# Patient Record
Sex: MALE | Race: OTHER | NOT HISPANIC OR LATINO
[De-identification: names, ages, dates, MRNs, and addresses within clinical notes are randomized per-mention and may not be internally consistent; named-entity substitution may affect disease eponyms.]

---

## 2022-01-08 ENCOUNTER — TRANSCRIPTION ENCOUNTER (OUTPATIENT)
Age: 67
End: 2022-01-08

## 2022-01-09 ENCOUNTER — OUTPATIENT (OUTPATIENT)
Dept: OUTPATIENT SERVICES | Facility: HOSPITAL | Age: 67
LOS: 1 days | End: 2022-01-09

## 2022-01-09 ENCOUNTER — APPOINTMENT (OUTPATIENT)
Dept: DISASTER EMERGENCY | Facility: HOSPITAL | Age: 67
End: 2022-01-09

## 2022-01-09 VITALS
HEART RATE: 67 BPM | TEMPERATURE: 99 F | RESPIRATION RATE: 18 BRPM | DIASTOLIC BLOOD PRESSURE: 71 MMHG | OXYGEN SATURATION: 97 % | SYSTOLIC BLOOD PRESSURE: 111 MMHG

## 2022-01-09 VITALS
HEART RATE: 66 BPM | DIASTOLIC BLOOD PRESSURE: 71 MMHG | SYSTOLIC BLOOD PRESSURE: 108 MMHG | OXYGEN SATURATION: 99 % | TEMPERATURE: 98 F | RESPIRATION RATE: 18 BRPM

## 2022-01-09 DIAGNOSIS — U07.1 COVID-19: ICD-10-CM

## 2022-01-09 PROBLEM — Z00.00 ENCOUNTER FOR PREVENTIVE HEALTH EXAMINATION: Status: ACTIVE | Noted: 2022-01-09

## 2022-01-09 RX ORDER — SOTROVIMAB 62.5 MG/ML
500 INJECTION, SOLUTION, CONCENTRATE INTRAVENOUS ONCE
Refills: 0 | Status: DISCONTINUED | OUTPATIENT
Start: 2022-01-09 | End: 2022-01-24

## 2022-01-09 RX ORDER — SODIUM CHLORIDE 9 MG/ML
250 INJECTION INTRAMUSCULAR; INTRAVENOUS; SUBCUTANEOUS
Refills: 0 | Status: DISCONTINUED | OUTPATIENT
Start: 2022-01-09 | End: 2022-01-24

## 2022-01-09 NOTE — CHART NOTE - NSCHARTNOTEFT_GEN_A_CORE
Sotrovimab infusion and post infusion protocol complete  Patient tolerated well and denies any new complaints   T(C): 36.8 (01-09-22 @ 15:15), Max: 37 (01-09-22 @ 13:16)  HR: 66 (01-09-22 @ 15:15) (65 - 67)  BP: 108/71 (01-09-22 @ 15:15) (99/63 - 111/71)  RR: 18 (01-09-22 @ 15:15) (18 - 18)  SpO2: 99% (01-09-22 @ 15:15) (96% - 99%)      Patient tolerated infusion well and is stable for discharge home  Discharge instructions and strict return precautions reviewed with the patient who indicates understanding. All questions answered.

## 2022-01-09 NOTE — CHART NOTE - NSCHARTNOTEFT_GEN_A_CORE
CC: Monoclonal Antibody Infusion/COVID 19 Positive      History: Patient presents for infusion of monoclonal antibody infusion. Patient has been screened and was deemed to be a candidate.    Exposure: Social gathering last weekend    Symptoms/ Criteria: cough headache     Inclusion Criteria: age > 65 years    Date of Positive Test: verified by clinical call center     Date of symptom onset: 1/5    Vaccine status: unvaccinated    PMHx:  Infection due to severe acute respiratory syndrome coronavirus 2 (SARS-CoV-2)    MEWS Score    COVID-19          T(C): 37 (01-09-22 @ 13:16), Max: 37 (01-09-22 @ 13:16)  HR: 66 (01-09-22 @ 13:46) (66 - 67)  BP: 104/66 (01-09-22 @ 13:46) (104/66 - 111/71)  RR: 18 (01-09-22 @ 13:46) (18 - 18)  SpO2: 96% (01-09-22 @ 13:46) (96% - 97%)      PE:   Appearance: NAD	  HEENT:   Normal oral mucosa.   Lymphatic: No lymphadenopathy  Cardiovascular: Normal S1 S2, No JVD, No murmurs, No edema  Respiratory: Lungs clear to auscultation	  Gastrointestinal:  Soft, Non-tender. No guarding   Skin: warm and dry  Neurologic: Non-focal  Extremities: Normal range of motion.     ASSESSMENT:  Pt is a 66y year old Male without significant  PMH  Covid +  referred to the infusion center for Monoclonal antibody infusion (Sotrovimab).        PLAN:  - infusion procedure explained to patient   - Consent for monoclonal antibody infusion obtained   - Risk & benefits discussed/all questions answered  - infuse Sotrovimab per protocol  - will observe patient for one hour post infusion  and then if stable discharge home with outpt follow up as planned by PMD.        - Patient tolerated infusion well denies complaints of chest pain/SOB/dizziness/ palpitations  - VSS for discharge home  - D/C instructions given/ fact sheet included.  - Patient to follow-up with PCP as needed.

## 2022-01-10 ENCOUNTER — APPOINTMENT (OUTPATIENT)
Dept: DISASTER EMERGENCY | Facility: HOSPITAL | Age: 67
End: 2022-01-10

## 2022-12-15 ENCOUNTER — APPOINTMENT (OUTPATIENT)
Dept: UROLOGY | Facility: CLINIC | Age: 67
End: 2022-12-15
Payer: COMMERCIAL

## 2022-12-15 VITALS
DIASTOLIC BLOOD PRESSURE: 76 MMHG | SYSTOLIC BLOOD PRESSURE: 130 MMHG | BODY MASS INDEX: 26.52 KG/M2 | TEMPERATURE: 97.5 F | HEIGHT: 66 IN | WEIGHT: 165 LBS | HEART RATE: 58 BPM

## 2022-12-15 PROCEDURE — 52000 CYSTOURETHROSCOPY: CPT

## 2022-12-15 PROCEDURE — 99204 OFFICE O/P NEW MOD 45 MIN: CPT | Mod: 25

## 2022-12-15 NOTE — HISTORY OF PRESENT ILLNESS
[FreeTextEntry1] : 67M BPH on flaomx 0.4 \par bring in MRI 11/2022\par 88 gram porstate with 1.5cm intravesical protrusion PRIADS 2\par MRI 2017 55 gram.\par prostate biopsy 2017 negative. PSA 8\par nocutira x 3-5\par increasing hesitancy\par decreased FOS\par +urgency\par no hematuria

## 2022-12-15 NOTE — ASSESSMENT
[FreeTextEntry1] : BPH\par on flaomx 0.4\par hesitant to take medication\par suboptimal urolift\par cysto today: large lateral and median lobes\par We spoke at length about the role of transurethral thermal ablation of the prostate/Rezum. He understands that this is a minimally invasive procedure which may help significantly with his lower urinary tract symptoms and get him off of medications. Risks, including pelvic pain, gross hematuria, dysuria and short lived urinary urgency were discussed at length. He understands that he will require a postoperative catheter for 2-3 days in most instances and that he is likely to return to work within a day or two. He was told that there is a very low risk of ejaculatory and erectile dysfunction postoperatively. Improvements in urinary symptoms are seen in 1-6 months after the procedure.\par The risks and benefits of transurethral resection of the prostate were discussed at length today. Given his symptoms and exam findings, I told him he has an approximately 70% likelihood of improvement in his LUTS/urinary retention in the postoperative period. He understands that this means that some men do not improve after surgery. The role of postop catheterization, and the potential for a longer term bruno were discussed at length. The risk of urinary tract infection and possible sepsis along with its associated sequelae were discussed. The very low risk of urinary incontinence as well as bladder overactivity were discussed. The risk of gross hematuria, and the fact that he cannot perform strenous activity for one month were also reported. He understands that he is likely to have retrograde ejaculation following this procedure.  He opts to proceed.\par \par for TURP\par

## 2022-12-15 NOTE — PHYSICAL EXAM
[Urethral Meatus] : meatus normal [Penis Abnormality] : normal uncircumcised penis [Urinary Bladder Findings] : the bladder was normal on palpation [Scrotum] : the scrotum was normal [Epididymis] : the epididymides were normal [Testes Tenderness] : no tenderness of the testes [Testes Mass (___cm)] : there were no testicular masses [FreeTextEntry1] : left inguinal hernia

## 2022-12-16 LAB
ANION GAP SERPL CALC-SCNC: 10 MMOL/L
APPEARANCE: CLEAR
APTT BLD: 34.3 SEC
BACTERIA: NEGATIVE
BASOPHILS # BLD AUTO: 0.03 K/UL
BASOPHILS NFR BLD AUTO: 0.5 %
BILIRUBIN URINE: NEGATIVE
BLOOD URINE: NEGATIVE
BUN SERPL-MCNC: 18 MG/DL
CALCIUM SERPL-MCNC: 9.7 MG/DL
CHLORIDE SERPL-SCNC: 103 MMOL/L
CO2 SERPL-SCNC: 29 MMOL/L
COLOR: NORMAL
CREAT SERPL-MCNC: 1.22 MG/DL
EGFR: 64 ML/MIN/1.73M2
EOSINOPHIL # BLD AUTO: 0.27 K/UL
EOSINOPHIL NFR BLD AUTO: 4.3 %
GLUCOSE QUALITATIVE U: NEGATIVE
GLUCOSE SERPL-MCNC: 96 MG/DL
HCT VFR BLD CALC: 42.2 %
HGB BLD-MCNC: 14 G/DL
HYALINE CASTS: 0 /LPF
IMM GRANULOCYTES NFR BLD AUTO: 0.5 %
INR PPP: 1 RATIO
KETONES URINE: NEGATIVE
LEUKOCYTE ESTERASE URINE: NEGATIVE
LYMPHOCYTES # BLD AUTO: 2.18 K/UL
LYMPHOCYTES NFR BLD AUTO: 35 %
MAN DIFF?: NORMAL
MCHC RBC-ENTMCNC: 31.5 PG
MCHC RBC-ENTMCNC: 33.2 GM/DL
MCV RBC AUTO: 94.8 FL
MICROSCOPIC-UA: NORMAL
MONOCYTES # BLD AUTO: 0.48 K/UL
MONOCYTES NFR BLD AUTO: 7.7 %
NEUTROPHILS # BLD AUTO: 3.23 K/UL
NEUTROPHILS NFR BLD AUTO: 52 %
NITRITE URINE: NEGATIVE
PH URINE: 6
PLATELET # BLD AUTO: 203 K/UL
POTASSIUM SERPL-SCNC: 4.5 MMOL/L
PROTEIN URINE: NEGATIVE
PSA SERPL-MCNC: 3.76 NG/ML
PT BLD: 11.7 SEC
RBC # BLD: 4.45 M/UL
RBC # FLD: 12.5 %
RED BLOOD CELLS URINE: 0 /HPF
SARS-COV-2 N GENE NPH QL NAA+PROBE: NOT DETECTED
SODIUM SERPL-SCNC: 142 MMOL/L
SPECIFIC GRAVITY URINE: 1.01
SQUAMOUS EPITHELIAL CELLS: 0 /HPF
UROBILINOGEN URINE: NORMAL
WBC # FLD AUTO: 6.22 K/UL
WHITE BLOOD CELLS URINE: 0 /HPF

## 2022-12-19 LAB — BACTERIA UR CULT: NORMAL

## 2023-01-05 ENCOUNTER — APPOINTMENT (OUTPATIENT)
Dept: HEART AND VASCULAR | Facility: CLINIC | Age: 68
End: 2023-01-05
Payer: COMMERCIAL

## 2023-01-05 ENCOUNTER — NON-APPOINTMENT (OUTPATIENT)
Age: 68
End: 2023-01-05

## 2023-01-05 VITALS
BODY MASS INDEX: 24.25 KG/M2 | HEART RATE: 62 BPM | TEMPERATURE: 97 F | SYSTOLIC BLOOD PRESSURE: 112 MMHG | DIASTOLIC BLOOD PRESSURE: 59 MMHG | WEIGHT: 160 LBS | HEIGHT: 68 IN

## 2023-01-05 DIAGNOSIS — Z78.9 OTHER SPECIFIED HEALTH STATUS: ICD-10-CM

## 2023-01-05 DIAGNOSIS — I48.0 PAROXYSMAL ATRIAL FIBRILLATION: ICD-10-CM

## 2023-01-05 DIAGNOSIS — Z82.49 FAMILY HISTORY OF ISCHEMIC HEART DISEASE AND OTHER DISEASES OF THE CIRCULATORY SYSTEM: ICD-10-CM

## 2023-01-05 PROCEDURE — 93000 ELECTROCARDIOGRAM COMPLETE: CPT

## 2023-01-05 PROCEDURE — 99204 OFFICE O/P NEW MOD 45 MIN: CPT | Mod: 25

## 2023-01-07 ENCOUNTER — OUTPATIENT (OUTPATIENT)
Dept: OUTPATIENT SERVICES | Facility: HOSPITAL | Age: 68
LOS: 1 days | End: 2023-01-07

## 2023-01-07 ENCOUNTER — APPOINTMENT (OUTPATIENT)
Dept: SLEEP CENTER | Facility: HOME HEALTH | Age: 68
End: 2023-01-07
Payer: COMMERCIAL

## 2023-01-07 PROCEDURE — 95800 SLP STDY UNATTENDED: CPT | Mod: 26

## 2023-01-09 ENCOUNTER — NON-APPOINTMENT (OUTPATIENT)
Age: 68
End: 2023-01-09

## 2023-01-09 LAB — SARS-COV-2 N GENE NPH QL NAA+PROBE: NOT DETECTED

## 2023-01-09 NOTE — PHYSICAL EXAM
[Well Developed] : well developed [Well Nourished] : well nourished [No Acute Distress] : no acute distress [5th Left ICS - MCL] : palpated at the 5th LICS in the midclavicular line [Normal Rate] : normal [Rhythm Regular] : regular [Normal S1] : normal S1 [Normal S2] : normal S2 [Clear Lung Fields] : clear lung fields [Good Air Entry] : good air entry [No Respiratory Distress] : no respiratory distress  [Soft] : abdomen soft [Normal Gait] : normal gait [No Edema] : no edema [No Rash] : no rash [Moves all extremities] : moves all extremities [No Focal Deficits] : no focal deficits [Alert and Oriented] : alert and oriented

## 2023-01-10 ENCOUNTER — TRANSCRIPTION ENCOUNTER (OUTPATIENT)
Age: 68
End: 2023-01-10

## 2023-01-10 NOTE — ASU PATIENT PROFILE, ADULT - NS PREOP UNDERSTANDS INFO
spoke to patient to be NPO after 2300 tonight , Allow to drink water and apple juice till 6 am , leave all valuable things at home. dress comfortable. non   smoking or alcohol, Bring ID photo,  and insurance  cards. escort arranged, address and telephone given to patient/yes

## 2023-01-10 NOTE — ASU PATIENT PROFILE, ADULT - NSICDXPASTMEDICALHX_GEN_ALL_CORE_FT
PAST MEDICAL HISTORY:  Cardiac arrhythmia     Enlarged prostate      PAST MEDICAL HISTORY:  2019 novel coronavirus disease (COVID-19) X2    Cardiac arrhythmia     Enlarged prostate     Hyperlipidemia     Pre-diabetes      PAST MEDICAL HISTORY:  2019 novel coronavirus disease (COVID-19) X2    Cardiac arrhythmia     Enlarged prostate     History of bilateral inguinal hernias wears belt for bilateral hernias    Hyperlipidemia     Pre-diabetes

## 2023-01-10 NOTE — ASU PATIENT PROFILE, ADULT - FALL HARM RISK - UNIVERSAL INTERVENTIONS
Bed in lowest position, wheels locked, appropriate side rails in place/Call bell, personal items and telephone in reach/Instruct patient to call for assistance before getting out of bed or chair/Non-slip footwear when patient is out of bed/[place] to call system/Physically safe environment - no spills, clutter or unnecessary equipment/Purposeful Proactive Rounding/Room/bathroom lighting operational, light cord in reach

## 2023-01-11 ENCOUNTER — RESULT REVIEW (OUTPATIENT)
Age: 68
End: 2023-01-11

## 2023-01-11 ENCOUNTER — OUTPATIENT (OUTPATIENT)
Dept: OUTPATIENT SERVICES | Facility: HOSPITAL | Age: 68
LOS: 1 days | Discharge: HOME | End: 2023-01-11
Payer: COMMERCIAL

## 2023-01-11 ENCOUNTER — TRANSCRIPTION ENCOUNTER (OUTPATIENT)
Age: 68
End: 2023-01-11

## 2023-01-11 ENCOUNTER — APPOINTMENT (OUTPATIENT)
Dept: UROLOGY | Facility: AMBULATORY SURGERY CENTER | Age: 68
End: 2023-01-11

## 2023-01-11 VITALS
OXYGEN SATURATION: 99 % | HEART RATE: 57 BPM | WEIGHT: 151.46 LBS | HEIGHT: 67 IN | TEMPERATURE: 98 F | RESPIRATION RATE: 17 BRPM | SYSTOLIC BLOOD PRESSURE: 127 MMHG | DIASTOLIC BLOOD PRESSURE: 65 MMHG

## 2023-01-11 VITALS
HEART RATE: 70 BPM | SYSTOLIC BLOOD PRESSURE: 114 MMHG | TEMPERATURE: 97 F | DIASTOLIC BLOOD PRESSURE: 52 MMHG | OXYGEN SATURATION: 98 % | RESPIRATION RATE: 16 BRPM

## 2023-01-11 DIAGNOSIS — G47.33 OBSTRUCTIVE SLEEP APNEA (ADULT) (PEDIATRIC): ICD-10-CM

## 2023-01-11 DIAGNOSIS — Z90.49 ACQUIRED ABSENCE OF OTHER SPECIFIED PARTS OF DIGESTIVE TRACT: Chronic | ICD-10-CM

## 2023-01-11 DIAGNOSIS — Z98.890 OTHER SPECIFIED POSTPROCEDURAL STATES: Chronic | ICD-10-CM

## 2023-01-11 PROCEDURE — 88305 TISSUE EXAM BY PATHOLOGIST: CPT | Mod: 26

## 2023-01-11 PROCEDURE — 52601 PROSTATECTOMY (TURP): CPT

## 2023-01-11 RX ORDER — TAMSULOSIN HYDROCHLORIDE 0.4 MG/1
1 CAPSULE ORAL
Qty: 0 | Refills: 0 | DISCHARGE

## 2023-01-11 RX ORDER — ONDANSETRON 8 MG/1
4 TABLET, FILM COATED ORAL ONCE
Refills: 0 | Status: DISCONTINUED | OUTPATIENT
Start: 2023-01-11 | End: 2023-01-11

## 2023-01-11 RX ORDER — SODIUM CHLORIDE 9 MG/ML
1000 INJECTION, SOLUTION INTRAVENOUS
Refills: 0 | Status: DISCONTINUED | OUTPATIENT
Start: 2023-01-11 | End: 2023-01-11

## 2023-01-11 RX ORDER — ACETAMINOPHEN 500 MG
1000 TABLET ORAL ONCE
Refills: 0 | Status: COMPLETED | OUTPATIENT
Start: 2023-01-11 | End: 2023-01-11

## 2023-01-11 RX ORDER — FENTANYL CITRATE 50 UG/ML
25 INJECTION INTRAVENOUS
Refills: 0 | Status: DISCONTINUED | OUTPATIENT
Start: 2023-01-11 | End: 2023-01-11

## 2023-01-11 RX ORDER — METOPROLOL TARTRATE 50 MG
1 TABLET ORAL
Qty: 0 | Refills: 0 | DISCHARGE

## 2023-01-11 RX ORDER — ACETAMINOPHEN WITH CODEINE 300MG-30MG
1 TABLET ORAL
Qty: 10 | Refills: 0
Start: 2023-01-11

## 2023-01-11 RX ADMIN — Medication 1000 MILLIGRAM(S): at 10:20

## 2023-01-11 RX ADMIN — SODIUM CHLORIDE 100 MILLILITER(S): 9 INJECTION, SOLUTION INTRAVENOUS at 15:23

## 2023-01-11 NOTE — ASU DISCHARGE PLAN (ADULT/PEDIATRIC) - ASU DC SPECIAL INSTRUCTIONSFT
TRANSURETHRAL RESECTION OF PROSTATE    GENERAL: It is common to have blood in your urine after your procedure.  It may be pink or even red; and it is important to increase fluid intake to 2-3L of water per day to keep the urine as clear as possible. Please inform your doctor if you have a significant amount of clot in the urine or if you are unable to void at all.  The urine may clear and then become bloody again especially as you are more physically active. It is not uncommon to have some burning when you urinate, this will gradually improve. With a catheter in place, it is not uncommon to have occasional leakage or urine or blood around the catheter. Please call your urologist if this is excessive and/or the urine is not draining through the catheter into the bag.    CATHETER: Most patients are sent home with a Bruno catheter, which continuously drains the urine from the bladder. If you still have a catheter, the nurses will review instructions and care before you go home. For men, you may have a prescription for lidocaine jelly to apply to the tip of your penis, as needed, for catheter related discomfort.    PAIN: You may take Tylenol (acetaminophen) 650-975mg and/or Motrin (ibuprofen) 400-600mg, both available over the counter, for pain every 6 hours as needed. Do not exceed 4000mg of Tylenol (acetaminophen) daily. You may alternate these medications such that you take one or the other every 3 hours for around the clock pain coverage.    STOOL SOFTENERS: Do not allow yourself to become constipated as straining may cause bleeding. Take stool softeners or a laxative (ex. Miralax, Colace, Senokot, ExLax, etc), available over the counter, if needed.    ANTICOAGULATION: If you are taking any blood thinning medications, please discuss with your urologist prior to restarting these medications unless otherwise specified.    BATHING: You may shower or bathe. If going home with bruno, shower only until catheter is removed.    DIET: You may resume your regular diet and regular medication regimen.    ACTIVITY: No heavy lifting or strenuous exercise until you are evaluated at your post-operative appointment. Otherwise, you may return to your usual level of physical activity.    FOLLOW-UP: If you did not already schedule your post-operative appointment, please call your urologist to schedule and follow-up appointment.    CALL YOUR UROLOGIST IF: You have any bleeding that does not stop, inability to void >8 hours, fever over 100.4 F, chills, persistent nausea/vomiting, changes in your incision concerning for infection, or if your pain is not controlled on your discharge pain medications.

## 2023-01-11 NOTE — REVIEW OF SYSTEMS
[Palpitations] : palpitations [Fever] : no fever [Weight Gain (___ Lbs)] : no recent weight gain [Chills] : no chills [Weight Loss (___ Lbs)] : no recent weight loss [SOB] : no shortness of breath [Dyspnea on exertion] : not dyspnea during exertion [Chest Discomfort] : no chest discomfort [Syncope] : no syncope [Cough] : no cough [Wheezing] : no wheezing [Rash] : no rash [Dizziness] : no dizziness [Confusion] : no confusion was observed [Easy Bleeding] : no tendency for easy bleeding

## 2023-01-11 NOTE — BRIEF OPERATIVE NOTE - NSICDXBRIEFPROCEDURE_GEN_ALL_CORE_FT
PROCEDURES:  Cystoscopy with transurethral resection of prostate (TURP) 11-Jan-2023 16:33:39  Gustavo Dubon

## 2023-01-11 NOTE — HISTORY OF PRESENT ILLNESS
[FreeTextEntry1] : 67 year old male with paroxysmal atrial fibrillation, who presents for initial evaluation.\par \par He states in March 2022 he went to [facility] and he was in an arrhythmia.  ER note scanned in says both atrial flutter and atrial fibrillation - though he remembers being told he had atrial fibrillation.  He states he was discharged with a blood thinner but he never took it.  As per ER notes echo at that time LVEF 65% with no significant valvular abnormalities.  He then states on new years otto he had palpitations and called 911 but they stopped before the ambulance arrived.  Recently he has been having palpitations more frequently.  He has been on Metoprolol or a week and feels better.  No clear precipitating or alleviating factors.   Though he states both big episodes of palpitations were in the setting of having a mild fever.  \par \par He has a urological procedure next week (TURP).  He admits to snoring.  No chest pain, syncope, near syncope, SOB or orthopnea.  \par

## 2023-01-11 NOTE — HISTORY OF PRESENT ILLNESS
[FreeTextEntry1] : 67 year old male with paroxysmal atrial fibrillation, who presents for initial evaluation.\par \par He states in March 2022 he went to [facility] and he was in an arrhythmia.  ER note scanned in says both atrial flutter and atrial fibrillation - though he remembers being told he had atrial fibrillation.  He states he was discharged with a blood thinner but he never took it.  As per ER notes echo at that time LVEF 65% with no significant valvular abnormalities.  He then states on new years otto he had palpitations and called 911 but they stopped before the ambulance arrived.  Recently he has been having palpitations more frequently.  He has been on Metoprolol or a week and feels better.  No clear precipitating or alleviating factors.   Though he states both big episodes of palpitations were in the setting of having a mild fever.  \par \par He has a urological procedure next week (TURP).  He admits to snoring.  No chest pain, syncope, near syncope, SOB or orthopnea.  \par

## 2023-01-11 NOTE — DISCUSSION/SUMMARY
[EKG obtained to assist in diagnosis and management of assessed problem(s)] : EKG obtained to assist in diagnosis and management of assessed problem(s) [FreeTextEntry1] : 67 year old male with paroxysmal atrial fibrillation, who presents for initial evaluation.  As per notes he has likely atrial fibrillation (?flutter). We discussed the normal conduction system of the heart and what atrial fibrillation is and the association with stroke.   His CHADSVASC score is at least 1 and we discussed he should be on oral anticoagulation which he is amenable to starting - but would not start until he is cleared from a TURP / procedural standpoint.  He snores and we discussed the association between sleep apnea and atrial arrhythmias and we will order him a sleep study.   His two major episodes were in the setting of being ill - but he has short episodes of palpitations.  He is doing better on a beta blocker.  Long term treatment options of paroxysmal atrial fibrillation include rate control or a rhythm control strategy with an ablation or antiarrhythmic medication.  He will follow up after his sleep study and urological procedure and we will likely place an event monitor to assess short episodes of palpitations if they continue.  He is interested in an ablation and we will assess at this time.  He knows to call with any questions or concerns.

## 2023-01-11 NOTE — ASU DISCHARGE PLAN (ADULT/PEDIATRIC) - NS MD DC FALL RISK RISK
For information on Fall & Injury Prevention, visit: [email or web address] OR  [email or web address] OR  https://www.cdc.gov/steadi/patient.html

## 2023-01-11 NOTE — PRE-ANESTHESIA EVALUATION ADULT - NSANTHPEFT_GEN_ALL_CORE
b/l BS clear  irregularly irregular radial pulse palpated; pt denies palpitations since initial dx of AFib "several weeks ago"  Denies Hx of syncope; endorses METS >4

## 2023-01-11 NOTE — ADDENDUM
[FreeTextEntry1] : I, Jonathan Webster, hereby attest that the medical record entry for this patient accurately reflects signatures/notations that I made on the Date of Service in my capacity as an Attending Physician when I treated/diagnosed the above patient. I do hereby attest that this information is true, accurate and complete to the best of my knowledge and I understand that any falsification, omission, or concealment of material fact may subject me to administrative, civil, or, criminal liability. I agree with the note as written by my PA in its entirety.\par I was present for the entire visit and supervised the entire visit and agree with the plan as outlined.\par \par \par I, Severiano Hernandez, am scribing for and the presence of Dr. Webster the following sections: HPI, PMH,Family/social history, ROS, Physical Exam, Assessment / Plan.\par

## 2023-01-13 PROBLEM — I49.9 CARDIAC ARRHYTHMIA, UNSPECIFIED: Chronic | Status: ACTIVE | Noted: 2023-01-10

## 2023-01-13 PROBLEM — U07.1 COVID-19: Chronic | Status: ACTIVE | Noted: 2023-01-11

## 2023-01-13 PROBLEM — Z87.19 PERSONAL HISTORY OF OTHER DISEASES OF THE DIGESTIVE SYSTEM: Chronic | Status: ACTIVE | Noted: 2023-01-11

## 2023-01-13 PROBLEM — E78.5 HYPERLIPIDEMIA, UNSPECIFIED: Chronic | Status: ACTIVE | Noted: 2023-01-11

## 2023-01-13 PROBLEM — R73.03 PREDIABETES: Chronic | Status: ACTIVE | Noted: 2023-01-11

## 2023-01-13 PROBLEM — N40.0 BENIGN PROSTATIC HYPERPLASIA WITHOUT LOWER URINARY TRACT SYMPTOMS: Chronic | Status: ACTIVE | Noted: 2023-01-10

## 2023-01-13 LAB
-  AMPICILLIN: SIGNIFICANT CHANGE UP
-  CIPROFLOXACIN: SIGNIFICANT CHANGE UP
-  NITROFURANTOIN: SIGNIFICANT CHANGE UP
-  TETRACYCLINE: SIGNIFICANT CHANGE UP
-  VANCOMYCIN: SIGNIFICANT CHANGE UP
CULTURE RESULTS: SIGNIFICANT CHANGE UP
METHOD TYPE: SIGNIFICANT CHANGE UP
ORGANISM # SPEC MICROSCOPIC CNT: SIGNIFICANT CHANGE UP
ORGANISM # SPEC MICROSCOPIC CNT: SIGNIFICANT CHANGE UP
SPECIMEN SOURCE: SIGNIFICANT CHANGE UP

## 2023-01-17 ENCOUNTER — APPOINTMENT (OUTPATIENT)
Dept: UROLOGY | Facility: CLINIC | Age: 68
End: 2023-01-17
Payer: COMMERCIAL

## 2023-01-17 VITALS — DIASTOLIC BLOOD PRESSURE: 74 MMHG | TEMPERATURE: 97.5 F | SYSTOLIC BLOOD PRESSURE: 128 MMHG | HEART RATE: 73 BPM

## 2023-01-17 PROCEDURE — 99024 POSTOP FOLLOW-UP VISIT: CPT

## 2023-01-17 PROCEDURE — 51798 US URINE CAPACITY MEASURE: CPT

## 2023-01-17 NOTE — HISTORY OF PRESENT ILLNESS
[FreeTextEntry1] : POD 6 s/p TURP\par feels well\par urine clear\par for voiding trial today\par voided 375\par pVR 40 r/o retention\par \par f/u 1 month

## 2023-01-20 LAB — SURGICAL PATHOLOGY STUDY: SIGNIFICANT CHANGE UP

## 2023-01-30 ENCOUNTER — NON-APPOINTMENT (OUTPATIENT)
Age: 68
End: 2023-01-30

## 2023-01-30 ENCOUNTER — APPOINTMENT (OUTPATIENT)
Dept: HEART AND VASCULAR | Facility: CLINIC | Age: 68
End: 2023-01-30
Payer: COMMERCIAL

## 2023-01-30 VITALS
SYSTOLIC BLOOD PRESSURE: 132 MMHG | HEART RATE: 66 BPM | HEIGHT: 68 IN | BODY MASS INDEX: 24.86 KG/M2 | WEIGHT: 164 LBS | TEMPERATURE: 97.5 F | DIASTOLIC BLOOD PRESSURE: 75 MMHG

## 2023-01-30 PROCEDURE — 93000 ELECTROCARDIOGRAM COMPLETE: CPT

## 2023-01-30 PROCEDURE — 99213 OFFICE O/P EST LOW 20 MIN: CPT | Mod: 25

## 2023-01-30 NOTE — REASON FOR VISIT
[Arrhythmia/ECG Abnorrmalities] : arrhythmia/ECG abnormalities [FreeTextEntry1] : 67 year old male with paroxysmal atrial fibrillation, who presents for urgent evaluation for palpitation.\par \par He states in March 2022 he went to [facility] and he was in an arrhythmia. ER note scanned in says both atrial flutter and atrial fibrillation - though he remembers being told he had atrial fibrillation. He states he was discharged with a blood thinner but he never took it. As per ER notes echo at that time LVEF 65% with no significant valvular abnormalities. He then states on new years otto he had palpitations and called 911 but they stopped before the ambulance arrived. He had been on Metoprolol or a month and felt generally better. No clear precipitating or alleviating factors. Though he states both big episodes of palpitations were in the setting of having a mild fever. \par Over the last week, he has been unable to sleep secondary to palpitation. By [employer], which he just purchased, his clinical arrhythmia is sinus with atrial premature beats, often trigeminy or bigeminy. There was one four beat run of atrial tachycardia. There was no fibrillation.\par \par He had a urological procedure two weeks ago and has been well since then. He admits to snoring but a home sleep study demonstrated no signs of sleep apnea.\par He has No chest pain, syncope, near syncope, SOB or orthopnea. \par He notes more symptoms at rest, and with exercise the extra beats seem to go away.\par \par His ECG shows sinus rhythm with rare atrial premature beats.

## 2023-01-30 NOTE — DISCUSSION/SUMMARY
[FreeTextEntry1] : He has a history of paroxysmal atrial fibrillation and flutter, but is clearly having symptoms from frequent atrial premature beats. These may represent the spectrum of arrhythmias from premature beats to nonsustained Atach to atrial fibrillation.\par He is interested in undergoing ablation, but given his recent  procedure, I will need to verify when he can be on unrestricted anticoagulation for an ablation as well as follow-up to that.\par He understands this and would like to proceed with scheduling as soon as possible. [EKG obtained to assist in diagnosis and management of assessed problem(s)] : EKG obtained to assist in diagnosis and management of assessed problem(s)

## 2023-02-16 ENCOUNTER — APPOINTMENT (OUTPATIENT)
Dept: UROLOGY | Facility: CLINIC | Age: 68
End: 2023-02-16
Payer: COMMERCIAL

## 2023-02-16 ENCOUNTER — APPOINTMENT (OUTPATIENT)
Dept: HEART AND VASCULAR | Facility: CLINIC | Age: 68
End: 2023-02-16

## 2023-02-16 VITALS
HEIGHT: 68 IN | HEART RATE: 76 BPM | SYSTOLIC BLOOD PRESSURE: 112 MMHG | TEMPERATURE: 98.1 F | DIASTOLIC BLOOD PRESSURE: 62 MMHG | BODY MASS INDEX: 24.86 KG/M2 | WEIGHT: 164 LBS

## 2023-02-16 PROCEDURE — 93975 VASCULAR STUDY: CPT

## 2023-02-16 PROCEDURE — 99024 POSTOP FOLLOW-UP VISIT: CPT

## 2023-02-16 RX ORDER — CIPROFLOXACIN HYDROCHLORIDE 500 MG/1
500 TABLET, FILM COATED ORAL
Qty: 14 | Refills: 0 | Status: ACTIVE | COMMUNITY
Start: 2023-02-16 | End: 1900-01-01

## 2023-02-16 NOTE — HISTORY OF PRESENT ILLNESS
[FreeTextEntry1] : reprots fevers to 101 at home\par new onset hematuria yesterday after strenous activity\par +perineal pain\par PVR r/o retention 6cc\par

## 2023-02-16 NOTE — ASSESSMENT
[FreeTextEntry1] : presumptive UTI\par UA UCX today\par CBC\par if persistent fevers to ER\par scrotal sono today no orchitis or epididymitis noted\par f/u 1 week

## 2023-02-20 LAB
APPEARANCE: ABNORMAL
BACTERIA UR CULT: NORMAL
BACTERIA: ABNORMAL
BILIRUBIN URINE: ABNORMAL
BLOOD URINE: ABNORMAL
COLOR: ABNORMAL
GLUCOSE QUALITATIVE U: ABNORMAL
HYALINE CASTS: 0 /LPF
KETONES URINE: ABNORMAL
LEUKOCYTE ESTERASE URINE: ABNORMAL
MICROSCOPIC-UA: NORMAL
NITRITE URINE: ABNORMAL
PH URINE: ABNORMAL
PROTEIN URINE: ABNORMAL
RED BLOOD CELLS URINE: >720 /HPF
SPECIFIC GRAVITY URINE: ABNORMAL
SQUAMOUS EPITHELIAL CELLS: 15 /HPF
UROBILINOGEN URINE: ABNORMAL
WHITE BLOOD CELLS URINE: >720 /HPF

## 2023-02-21 ENCOUNTER — APPOINTMENT (OUTPATIENT)
Dept: UROLOGY | Facility: CLINIC | Age: 68
End: 2023-02-21
Payer: COMMERCIAL

## 2023-02-21 VITALS — TEMPERATURE: 98.4 F | SYSTOLIC BLOOD PRESSURE: 120 MMHG | DIASTOLIC BLOOD PRESSURE: 69 MMHG | HEART RATE: 68 BPM

## 2023-02-21 PROCEDURE — 99024 POSTOP FOLLOW-UP VISIT: CPT

## 2023-02-21 NOTE — ASSESSMENT
[FreeTextEntry1] : BPH s/p TURP\par UTI ?\par culture was inconclusive\par was feeling better until yesterday with antibiotics\par UA UCX CBC\par if persistent fever consider admission/PCP eval\par 
Statement Selected

## 2023-02-21 NOTE — HISTORY OF PRESENT ILLNESS
[FreeTextEntry1] : BPH s/p TURP 11/01/2023\par returns for follow up.\par \par started abx on thursday\par had chills and fever again today and yesterday, over 100 F.\par no other symptom. \par no longer has hematuria.\par didn't take it today due to fever.\par \par inconclusive urine culture (more than 3 germs, reports contamination)\par urinalysis few bacteria, + RBC.

## 2023-02-21 NOTE — PHYSICAL EXAM
[Urethral Meatus] : meatus normal [Penis Abnormality] : normal circumcised penis [Scrotum] : the scrotum was normal [Epididymis] : the epididymides were normal [Testes Tenderness] : no tenderness of the testes [Testes Mass (___cm)] : there were no testicular masses [FreeTextEntry1] : firm right epidydimis.

## 2023-02-22 LAB
ALBUMIN SERPL ELPH-MCNC: 4 G/DL
ALP BLD-CCNC: 110 U/L
ALT SERPL-CCNC: 43 U/L
ANION GAP SERPL CALC-SCNC: 15 MMOL/L
APPEARANCE: ABNORMAL
AST SERPL-CCNC: 23 U/L
BACTERIA UR CULT: NORMAL
BACTERIA: ABNORMAL
BASOPHILS # BLD AUTO: 0.07 K/UL
BASOPHILS NFR BLD AUTO: 0.6 %
BILIRUB SERPL-MCNC: 0.4 MG/DL
BILIRUBIN URINE: NEGATIVE
BLOOD URINE: ABNORMAL
BUN SERPL-MCNC: 12 MG/DL
CALCIUM SERPL-MCNC: 9.1 MG/DL
CHLORIDE SERPL-SCNC: 98 MMOL/L
CO2 SERPL-SCNC: 24 MMOL/L
COLOR: YELLOW
CREAT SERPL-MCNC: 0.86 MG/DL
EGFR: 94 ML/MIN/1.73M2
EOSINOPHIL # BLD AUTO: 0.14 K/UL
EOSINOPHIL NFR BLD AUTO: 1.2 %
GLUCOSE QUALITATIVE U: NEGATIVE
GLUCOSE SERPL-MCNC: 122 MG/DL
HCT VFR BLD CALC: 36.1 %
HGB BLD-MCNC: 12.2 G/DL
HYALINE CASTS: 0 /LPF
IMM GRANULOCYTES NFR BLD AUTO: 4.7 %
KETONES URINE: NEGATIVE
LEUKOCYTE ESTERASE URINE: ABNORMAL
LYMPHOCYTES # BLD AUTO: 1.28 K/UL
LYMPHOCYTES NFR BLD AUTO: 11 %
MAN DIFF?: NORMAL
MCHC RBC-ENTMCNC: 31.3 PG
MCHC RBC-ENTMCNC: 33.8 GM/DL
MCV RBC AUTO: 92.6 FL
MICROSCOPIC-UA: NORMAL
MONOCYTES # BLD AUTO: 1.02 K/UL
MONOCYTES NFR BLD AUTO: 8.8 %
NEUTROPHILS # BLD AUTO: 8.56 K/UL
NEUTROPHILS NFR BLD AUTO: 73.7 %
NITRITE URINE: NEGATIVE
PH URINE: 6
PLATELET # BLD AUTO: 235 K/UL
POTASSIUM SERPL-SCNC: 4.7 MMOL/L
PROT SERPL-MCNC: 6.6 G/DL
PROTEIN URINE: ABNORMAL
RBC # BLD: 3.9 M/UL
RBC # FLD: 12.9 %
RED BLOOD CELLS URINE: 8 /HPF
SODIUM SERPL-SCNC: 137 MMOL/L
SPECIFIC GRAVITY URINE: 1.01
SQUAMOUS EPITHELIAL CELLS: 1 /HPF
UROBILINOGEN URINE: NORMAL
WBC # FLD AUTO: 11.62 K/UL
WHITE BLOOD CELLS URINE: 326 /HPF

## 2023-02-28 ENCOUNTER — APPOINTMENT (OUTPATIENT)
Dept: UROLOGY | Facility: CLINIC | Age: 68
End: 2023-02-28
Payer: COMMERCIAL

## 2023-02-28 VITALS — HEART RATE: 62 BPM | DIASTOLIC BLOOD PRESSURE: 72 MMHG | SYSTOLIC BLOOD PRESSURE: 120 MMHG | TEMPERATURE: 98.9 F

## 2023-02-28 DIAGNOSIS — R35.1 BENIGN PROSTATIC HYPERPLASIA WITH LOWER URINARY TRACT SYMPMS: ICD-10-CM

## 2023-02-28 DIAGNOSIS — N40.1 BENIGN PROSTATIC HYPERPLASIA WITH LOWER URINARY TRACT SYMPMS: ICD-10-CM

## 2023-02-28 PROCEDURE — 99024 POSTOP FOLLOW-UP VISIT: CPT

## 2023-02-28 RX ORDER — TAMSULOSIN HYDROCHLORIDE 0.4 MG/1
0.4 CAPSULE ORAL
Refills: 0 | Status: DISCONTINUED | COMMUNITY
Start: 2023-01-05 | End: 2023-02-28

## 2023-02-28 NOTE — HISTORY OF PRESENT ILLNESS
[FreeTextEntry1] : returns for followup\par fevers resolved\par feels better\par WBC 11.6\par CMP normal\par UCX negative\par \par

## 2023-05-30 ENCOUNTER — APPOINTMENT (OUTPATIENT)
Dept: UROLOGY | Facility: CLINIC | Age: 68
End: 2023-05-30

## 2023-06-08 ENCOUNTER — NON-APPOINTMENT (OUTPATIENT)
Age: 68
End: 2023-06-08

## 2023-06-08 ENCOUNTER — APPOINTMENT (OUTPATIENT)
Dept: HEART AND VASCULAR | Facility: CLINIC | Age: 68
End: 2023-06-08
Payer: COMMERCIAL

## 2023-06-08 VITALS
DIASTOLIC BLOOD PRESSURE: 68 MMHG | HEART RATE: 58 BPM | HEIGHT: 68 IN | TEMPERATURE: 95 F | BODY MASS INDEX: 23.95 KG/M2 | SYSTOLIC BLOOD PRESSURE: 123 MMHG | WEIGHT: 158 LBS | OXYGEN SATURATION: 98 %

## 2023-06-08 PROCEDURE — 99214 OFFICE O/P EST MOD 30 MIN: CPT | Mod: 25

## 2023-06-08 PROCEDURE — 93000 ELECTROCARDIOGRAM COMPLETE: CPT

## 2023-06-08 NOTE — PHYSICAL EXAM
[Well Developed] : well developed [Well Nourished] : well nourished [No Acute Distress] : no acute distress [Normal Conjunctiva] : normal conjunctiva [Normal Venous Pressure] : normal venous pressure [Normal S1, S2] : normal S1, S2 [No Murmur] : no murmur [No Rub] : no rub [No Gallop] : no gallop [Clear Lung Fields] : clear lung fields [Good Air Entry] : good air entry [No Respiratory Distress] : no respiratory distress  [Soft] : abdomen soft [Normal Bowel Sounds] : normal bowel sounds [Normal Gait] : normal gait [No Edema] : no edema [No Rash] : no rash [Moves all extremities] : moves all extremities [Normal Speech] : normal speech [Alert and Oriented] : alert and oriented [Normal memory] : normal memory

## 2023-06-19 NOTE — DISCUSSION/SUMMARY
[EKG obtained to assist in diagnosis and management of assessed problem(s)] : EKG obtained to assist in diagnosis and management of assessed problem(s) [FreeTextEntry1] : 68 year old male with paroxysmal atrial fibrillation and APCs, who presents for follow up.  His symptoms are consistent with APCs.  He has a history of paroxysmal atrial fibrillation and flutter.  He is not on oral anticoagulation (CHADS 1) secondary to recent urological procedure.  Now he is undergoing hernia surgery.  After this we can consider an ablation and will start oral anticoagulation (he is interested in an ablation).  He is cleared from an EP perspective for his upcoming hernia surgery.  He will follow up after his hernia surgery.  He knows to call with any questions or concerns.  \par

## 2023-06-19 NOTE — HISTORY OF PRESENT ILLNESS
[FreeTextEntry1] : 68 year old male with paroxysmal atrial fibrillation and APCs, who presents for follow up.\par \par He states in March 2022 he went to [facility] and he was in an arrhythmia. ER note scanned in says both atrial flutter and atrial fibrillation - though he remembers being told he had atrial fibrillation. He states he was discharged with a blood thinner but he never took it. As per ER notes echo at that time LVEF 65% with no significant valvular abnormalities. He then states on new years otto he had palpitations and called 911 but they stopped before the ambulance arrived. He was placed on Metoprolol and generally felt better.  He overall feels well.  He states since his last visit he has not had atrial fibrillation but has felt APCs.  Prostate issue has resolved.  He has an upcoming hernia surgery.  \par \par  \par He has No chest pain, syncope, near syncope, SOB or orthopnea. \par

## 2023-07-11 ENCOUNTER — APPOINTMENT (OUTPATIENT)
Dept: UROLOGY | Facility: CLINIC | Age: 68
End: 2023-07-11
Payer: COMMERCIAL

## 2023-07-11 VITALS
HEART RATE: 52 BPM | HEIGHT: 68 IN | BODY MASS INDEX: 23.95 KG/M2 | TEMPERATURE: 96 F | WEIGHT: 158 LBS | SYSTOLIC BLOOD PRESSURE: 109 MMHG | DIASTOLIC BLOOD PRESSURE: 64 MMHG

## 2023-07-11 PROCEDURE — 51798 US URINE CAPACITY MEASURE: CPT

## 2023-07-11 PROCEDURE — 99212 OFFICE O/P EST SF 10 MIN: CPT | Mod: 25

## 2023-07-11 NOTE — HISTORY OF PRESENT ILLNESS
[FreeTextEntry1] : 6 months s/p TURP\par now off of medication\par PVR 8cc\par 12/2022 PSA 3.76\par 30g TURP BPH\par

## 2023-09-11 RX ORDER — METOPROLOL SUCCINATE 25 MG/1
25 TABLET, EXTENDED RELEASE ORAL
Qty: 180 | Refills: 2 | Status: ACTIVE | COMMUNITY
Start: 2023-01-05 | End: 1900-01-01

## 2023-10-03 NOTE — ASU PREOP CHECKLIST - HAND OFF
DERMATOPATHOLOGY RESULT NOTE    Results reviewed by ordering physician. Called patient to personally discuss results. Discussed results with patient. Instructions for Clinical Derm Team:   (remember to route Result Note to appropriate staff):    Call patient and schedule for Mohs    Result & Plan by Specimen:    Specimen A: malignant BCC nodular/micronodular  Plan: MOHs    0 Result Notes    1 Follow-up Encounter      Component  Resulting Agency  Case Report  Surgical Pathology Report                         Case: [id number]                                   Authorizing Provider: Pollo South MD     Collected:           09/26/2023 1644              Ordering Location:     [facility]      Received:            09/26/2023 1644                                   [facility]                                                                Pathologist:           Pollo South MD                                                       Specimen:    Skin, Other, specimen A- left frontal scalp                                              Final Diagnosis  A.  Skin, left frontal scalp, shave biopsy:  BASAL CELL CARCINOMA, NODULAR/MICRONODULAR TYPES; extending to biopsy margins.        Electronically signed by Pollo South MD on 10/2/2023 at  8:24 AM Unit RN to OR RN

## (undated) DEVICE — MARKING PEN W RULER

## (undated) DEVICE — SOL IRR BAG NS 0.9% 3000ML

## (undated) DEVICE — NDL HYPO SAFE 18G X 1.5" (PINK)

## (undated) DEVICE — DRAPE TOWEL BLUE 17" X 24"

## (undated) DEVICE — ELCTR CUTTING LOOP MONOPOLAR ANGLED 24F

## (undated) DEVICE — POSITIONER FOAM EGG CRATE ULNAR 2PCS (PINK)

## (undated) DEVICE — PACK CYSTO

## (undated) DEVICE — GLV 7.5 PROTEXIS (WHITE)

## (undated) DEVICE — TAPE SILK 3"

## (undated) DEVICE — TUBING RANGER FLUID IRRIGATION SET DISP

## (undated) DEVICE — DRSG TELFA 3 X 8

## (undated) DEVICE — FOLEY CATH 3-WAY 20FR 30CC LATEX HEMATURIA

## (undated) DEVICE — SLV COMPRESSION KNEE MED

## (undated) DEVICE — TUBING TUR 2 PRONG

## (undated) DEVICE — UROVAC

## (undated) DEVICE — ELCTR PLASMA BAND 24FR 12-30 DEG

## (undated) DEVICE — ELCTR PLASMA LOOP MEDIUM ANGLED 24FR 12-30 DEG

## (undated) DEVICE — DRAINAGE BAG URINARY 4L

## (undated) DEVICE — FOLEY CATH 3-WAY 22FR 30CC LATEX HEMATURIA

## (undated) DEVICE — ELCTR PLASMA BUTTON OVAL 24FR 12-30 DEG

## (undated) DEVICE — WARMING BLANKET UPPER ADULT

## (undated) DEVICE — VENODYNE/SCD SLEEVE CALF MEDIUM